# Patient Record
Sex: FEMALE | Race: WHITE | NOT HISPANIC OR LATINO | Employment: OTHER | URBAN - METROPOLITAN AREA
[De-identification: names, ages, dates, MRNs, and addresses within clinical notes are randomized per-mention and may not be internally consistent; named-entity substitution may affect disease eponyms.]

---

## 2017-12-05 ENCOUNTER — HOSPITAL ENCOUNTER (EMERGENCY)
Facility: HOSPITAL | Age: 72
Discharge: HOME | End: 2017-12-05
Attending: EMERGENCY MEDICINE
Payer: MEDICARE

## 2017-12-05 DIAGNOSIS — N30.01 ACUTE CYSTITIS WITH HEMATURIA: ICD-10-CM

## 2017-12-05 DIAGNOSIS — R05.9 COUGH: ICD-10-CM

## 2017-12-05 DIAGNOSIS — R42 DIZZINESS: ICD-10-CM

## 2017-12-05 DIAGNOSIS — J40 BRONCHITIS: Primary | ICD-10-CM

## 2017-12-05 LAB
ALBUMIN SERPL BCP-MCNC: 3.6 G/DL
ALP SERPL-CCNC: 73 U/L
ALT SERPL W/O P-5'-P-CCNC: 29 U/L
ANION GAP SERPL CALC-SCNC: 9 MMOL/L
AST SERPL-CCNC: 21 U/L
BACTERIA #/AREA URNS HPF: ABNORMAL /HPF
BASOPHILS # BLD AUTO: 0.02 K/UL
BASOPHILS NFR BLD: 0.1 %
BILIRUB SERPL-MCNC: 0.4 MG/DL
BILIRUB UR QL STRIP: NEGATIVE
BNP SERPL-MCNC: 78 PG/ML
BUN SERPL-MCNC: 13 MG/DL
CALCIUM SERPL-MCNC: 9.3 MG/DL
CHLORIDE SERPL-SCNC: 102 MMOL/L
CLARITY UR: ABNORMAL
CO2 SERPL-SCNC: 24 MMOL/L
COLOR UR: YELLOW
CREAT SERPL-MCNC: 0.8 MG/DL
DIFFERENTIAL METHOD: ABNORMAL
EOSINOPHIL # BLD AUTO: 0.1 K/UL
EOSINOPHIL NFR BLD: 0.4 %
ERYTHROCYTE [DISTWIDTH] IN BLOOD BY AUTOMATED COUNT: 12.7 %
EST. GFR  (AFRICAN AMERICAN): >60 ML/MIN/1.73 M^2
EST. GFR  (NON AFRICAN AMERICAN): >60 ML/MIN/1.73 M^2
FLUAV AG SPEC QL IA: NEGATIVE
FLUBV AG SPEC QL IA: NEGATIVE
GLUCOSE SERPL-MCNC: 154 MG/DL
GLUCOSE UR QL STRIP: NEGATIVE
HCT VFR BLD AUTO: 42 %
HGB BLD-MCNC: 13.7 G/DL
HGB UR QL STRIP: ABNORMAL
KETONES UR QL STRIP: NEGATIVE
LEUKOCYTE ESTERASE UR QL STRIP: ABNORMAL
LIPASE SERPL-CCNC: 22 U/L
LYMPHOCYTES # BLD AUTO: 1.7 K/UL
LYMPHOCYTES NFR BLD: 12 %
MAGNESIUM SERPL-MCNC: 2 MG/DL
MCH RBC QN AUTO: 29.1 PG
MCHC RBC AUTO-ENTMCNC: 32.6 G/DL
MCV RBC AUTO: 89 FL
MICROSCOPIC COMMENT: ABNORMAL
MONOCYTES # BLD AUTO: 0.9 K/UL
MONOCYTES NFR BLD: 6.6 %
NEUTROPHILS # BLD AUTO: 11.3 K/UL
NEUTROPHILS NFR BLD: 80.9 %
NITRITE UR QL STRIP: POSITIVE
NON-SQ EPI CELLS #/AREA URNS HPF: 3 /HPF
PH UR STRIP: 5 [PH] (ref 5–8)
PHOSPHATE SERPL-MCNC: 2.5 MG/DL
PLATELET # BLD AUTO: 215 K/UL
PMV BLD AUTO: 9.5 FL
POTASSIUM SERPL-SCNC: 3.9 MMOL/L
PROT SERPL-MCNC: 7.9 G/DL
PROT UR QL STRIP: NEGATIVE
RBC # BLD AUTO: 4.71 M/UL
RBC #/AREA URNS HPF: 3 /HPF (ref 0–4)
SODIUM SERPL-SCNC: 135 MMOL/L
SP GR UR STRIP: 1.02 (ref 1–1.03)
SPECIMEN SOURCE: NORMAL
SQUAMOUS #/AREA URNS HPF: 15 /HPF
TROPONIN I SERPL DL<=0.01 NG/ML-MCNC: 0.01 NG/ML
URN SPEC COLLECT METH UR: ABNORMAL
UROBILINOGEN UR STRIP-ACNC: NEGATIVE EU/DL
WBC # BLD AUTO: 14.02 K/UL
WBC #/AREA URNS HPF: >100 /HPF (ref 0–5)
WBC CLUMPS URNS QL MICRO: ABNORMAL

## 2017-12-05 PROCEDURE — 84484 ASSAY OF TROPONIN QUANT: CPT

## 2017-12-05 PROCEDURE — 93010 ELECTROCARDIOGRAM REPORT: CPT | Mod: ,,, | Performed by: INTERNAL MEDICINE

## 2017-12-05 PROCEDURE — 96361 HYDRATE IV INFUSION ADD-ON: CPT

## 2017-12-05 PROCEDURE — 83690 ASSAY OF LIPASE: CPT

## 2017-12-05 PROCEDURE — 25000242 PHARM REV CODE 250 ALT 637 W/ HCPCS: Performed by: EMERGENCY MEDICINE

## 2017-12-05 PROCEDURE — 83735 ASSAY OF MAGNESIUM: CPT

## 2017-12-05 PROCEDURE — 63600175 PHARM REV CODE 636 W HCPCS: Performed by: PHYSICIAN ASSISTANT

## 2017-12-05 PROCEDURE — 94761 N-INVAS EAR/PLS OXIMETRY MLT: CPT

## 2017-12-05 PROCEDURE — 84100 ASSAY OF PHOSPHORUS: CPT

## 2017-12-05 PROCEDURE — 96374 THER/PROPH/DIAG INJ IV PUSH: CPT

## 2017-12-05 PROCEDURE — 85025 COMPLETE CBC W/AUTO DIFF WBC: CPT

## 2017-12-05 PROCEDURE — 99284 EMERGENCY DEPT VISIT MOD MDM: CPT | Mod: 25

## 2017-12-05 PROCEDURE — 80053 COMPREHEN METABOLIC PANEL: CPT

## 2017-12-05 PROCEDURE — 83880 ASSAY OF NATRIURETIC PEPTIDE: CPT

## 2017-12-05 PROCEDURE — 94640 AIRWAY INHALATION TREATMENT: CPT

## 2017-12-05 PROCEDURE — 81000 URINALYSIS NONAUTO W/SCOPE: CPT

## 2017-12-05 PROCEDURE — 25000003 PHARM REV CODE 250: Performed by: EMERGENCY MEDICINE

## 2017-12-05 PROCEDURE — 87400 INFLUENZA A/B EACH AG IA: CPT | Mod: 59

## 2017-12-05 PROCEDURE — 93005 ELECTROCARDIOGRAM TRACING: CPT

## 2017-12-05 RX ORDER — IPRATROPIUM BROMIDE AND ALBUTEROL SULFATE 2.5; .5 MG/3ML; MG/3ML
3 SOLUTION RESPIRATORY (INHALATION)
Status: COMPLETED | OUTPATIENT
Start: 2017-12-05 | End: 2017-12-05

## 2017-12-05 RX ORDER — LEVOFLOXACIN 750 MG/1
750 TABLET ORAL DAILY
Qty: 7 TABLET | Refills: 0 | Status: SHIPPED | OUTPATIENT
Start: 2017-12-05 | End: 2017-12-12

## 2017-12-05 RX ORDER — ALBUTEROL SULFATE 90 UG/1
2 AEROSOL, METERED RESPIRATORY (INHALATION) EVERY 4 HOURS PRN
Qty: 1 INHALER | Refills: 2 | Status: SHIPPED | OUTPATIENT
Start: 2017-12-05

## 2017-12-05 RX ORDER — ONDANSETRON 2 MG/ML
4 INJECTION INTRAMUSCULAR; INTRAVENOUS
Status: COMPLETED | OUTPATIENT
Start: 2017-12-05 | End: 2017-12-05

## 2017-12-05 RX ORDER — BENZONATATE 200 MG/1
200 CAPSULE ORAL 3 TIMES DAILY PRN
Qty: 20 CAPSULE | Refills: 0 | Status: SHIPPED | OUTPATIENT
Start: 2017-12-05 | End: 2017-12-15

## 2017-12-05 RX ORDER — MOXIFLOXACIN HYDROCHLORIDE 400 MG/1
400 TABLET ORAL DAILY
Status: DISCONTINUED | OUTPATIENT
Start: 2017-12-05 | End: 2017-12-06 | Stop reason: HOSPADM

## 2017-12-05 RX ADMIN — SODIUM CHLORIDE 1000 ML: 0.9 INJECTION, SOLUTION INTRAVENOUS at 09:12

## 2017-12-05 RX ADMIN — IPRATROPIUM BROMIDE AND ALBUTEROL SULFATE 3 ML: .5; 3 SOLUTION RESPIRATORY (INHALATION) at 09:12

## 2017-12-05 RX ADMIN — MOXIFLOXACIN HYDROCHLORIDE 400 MG: 400 TABLET, FILM COATED ORAL at 11:12

## 2017-12-05 RX ADMIN — ONDANSETRON 4 MG: 2 INJECTION INTRAMUSCULAR; INTRAVENOUS at 08:12

## 2017-12-06 VITALS
WEIGHT: 144 LBS | HEIGHT: 64 IN | RESPIRATION RATE: 16 BRPM | TEMPERATURE: 99 F | SYSTOLIC BLOOD PRESSURE: 150 MMHG | BODY MASS INDEX: 24.59 KG/M2 | HEART RATE: 88 BPM | DIASTOLIC BLOOD PRESSURE: 71 MMHG | OXYGEN SATURATION: 95 %

## 2017-12-06 NOTE — ED PROVIDER NOTES
Encounter Date: 12/5/2017    SCRIBE #1 NOTE: I, Vidya France, am scribing for, and in the presence of,  Jolanta John MD. I have scribed the following portions of the note - Other sections scribed: HPI, ROS, PE.       History     Chief Complaint   Patient presents with    Abdominal Pain     vomiting and diarrhea x 2 days.  middle/upper abd pains x 3 days.  denies blood in stool or burning with urination.  unknown fever.     CC: Fatigue    HPI: 72 year old female with HTN presents to the ED c/o fatigue, decrease in appetite, subjective fever, rhinorrhea, sore throat, and productive cough that began yesterday. Pt reports she had recent sick contact with her  who had similar symptoms a few days ago.     Pt attempted treatment with tylenol and mucinex which provided no relief. Pt is also c/o generalized abdominal pain that began upon arrival to the ED. Pain is acute onset and moderate (6/10). Pt reports pain has spontaneously resolved. Pt denies having any abdominal pain currently. No further complaints reported. Pt otherwise denies SOB, chest pain, N/V/D, changes in urinary/bowel habits, and any other associated symptoms. No alleviating factors.     No changes to daily medications. She reports she is compliant with her daily medications. No hx of cardiac diseases or DM.    PSHx: Tubal ligation; Appendectomy; and Cholecystectomy      The history is provided by the patient. No  was used.     Review of patient's allergies indicates:   Allergen Reactions    Aspirin Other (See Comments)     Reaction: Abdominal pain    Azithromycin Nausea And Vomiting     Past Medical History:   Diagnosis Date    Hypertension      Past Surgical History:   Procedure Laterality Date    APPENDECTOMY      CHOLECYSTECTOMY      TUBAL LIGATION       Family History   Problem Relation Age of Onset    Hypertension Mother     Ulcers Father      Social History   Substance Use Topics    Smoking status: Never Smoker     Smokeless tobacco: Never Used    Alcohol use No     Review of Systems   Constitutional: Positive for appetite change (decrease), fatigue and fever. Negative for chills and diaphoresis.   HENT: Positive for rhinorrhea and sore throat. Negative for ear pain.    Eyes: Negative for photophobia and visual disturbance.   Respiratory: Positive for cough (productive). Negative for shortness of breath.    Cardiovascular: Negative for chest pain.   Gastrointestinal: Positive for abdominal pain (generalized; resolved). Negative for diarrhea, nausea and vomiting.   Genitourinary: Negative for dysuria.   Musculoskeletal: Negative for back pain and neck stiffness.   Skin: Negative for rash.   Neurological: Negative for headaches.       Physical Exam     Initial Vitals [12/05/17 1846]   BP Pulse Resp Temp SpO2   (!) 164/78 76 17 98.8 °F (37.1 °C) 96 %      MAP       106.67         Physical Exam    Nursing note and vitals reviewed.  Constitutional: She appears well-developed and well-nourished. She is not diaphoretic. She does not appear ill. No distress.   Awake, alert, appears non-toxic   HENT:   Head: Normocephalic and atraumatic.   Dry oropharynx, occasional cough   Eyes: Conjunctivae and EOM are normal. Pupils are equal, round, and reactive to light.   Neck: Normal range of motion. Neck supple.   Cardiovascular: Normal rate, regular rhythm and intact distal pulses.   Pulmonary/Chest: Effort normal and breath sounds normal. No respiratory distress. She has no wheezes. She has no rhonchi. She has no rales.   Abdominal: Soft. Normal appearance and bowel sounds are normal. She exhibits no distension. There is no tenderness.   Musculoskeletal: Normal range of motion. She exhibits no edema or tenderness.   No lower extremity edema   Neurological: She is alert and oriented to person, place, and time. She has normal strength.   Skin: Skin is warm and dry.   Psychiatric: She has a normal mood and affect.         ED Course    Procedures  Labs Reviewed   CBC W/ AUTO DIFFERENTIAL - Abnormal; Notable for the following:        Result Value    WBC 14.02 (*)     Gran # 11.3 (*)     Gran% 80.9 (*)     Lymph% 12.0 (*)     All other components within normal limits   COMPREHENSIVE METABOLIC PANEL - Abnormal; Notable for the following:     Sodium 135 (*)     Glucose 154 (*)     All other components within normal limits   URINALYSIS - Abnormal; Notable for the following:     Appearance, UA Cloudy (*)     Occult Blood UA 1+ (*)     Nitrite, UA Positive (*)     Leukocytes, UA 3+ (*)     All other components within normal limits   PHOSPHORUS - Abnormal; Notable for the following:     Phosphorus 2.5 (*)     All other components within normal limits   URINALYSIS MICROSCOPIC - Abnormal; Notable for the following:     WBC, UA >100 (*)     WBC Clumps, UA Many (*)     Bacteria, UA Many (*)     Non-Squam Epith 3 (*)     All other components within normal limits   LIPASE   MAGNESIUM   INFLUENZA A AND B ANTIGEN   B-TYPE NATRIURETIC PEPTIDE   TROPONIN I     EKG Readings: (Independently Interpreted)   NSR, HR 74, normal axis, normal intervals, no STEMI.       X-Rays:   Independently Interpreted Readings:   Other Readings:  CXR no infiltrate, faint interstitial edema vs scarring    Medical Decision Making:   History:   Old Medical Records: I decided to obtain old medical records.  Old Records Summarized: records from previous admission(s).  Initial Assessment:   This is an emergent evaluation of a 72-year-old female with a history of hypertension who presents with fatigue, subjective fever, rhinorrhea, cough.  Also had brief episode of resolved abdominal pain here.  Differential Diagnosis:   Ddx includes pneumonia, bronchitis, influenza or other viral illness, CHF, ACS, UTI, other.  Independently Interpreted Test(s):   I have ordered and independently interpreted X-rays - see prior notes.  I have ordered and independently interpreted EKG Reading(s) - see prior  notes  Clinical Tests:   Lab Tests: Ordered and Reviewed  Radiological Study: Ordered and Reviewed  Medical Tests: Ordered and Reviewed  ED Management:  EKG no STEMI.  Chest x-ray no pneumonia.  Lab work shows WBC 14 with left shift.  No bandemia.  CMP is unremarkable.  Lipase normal.  Flu negative.  BNP and troponin negative.  UA with 3+ leuks, nitrites.    The patient had a normal saline bolus here as well as DuoNeb's and Zofran.  She felt somewhat at her after this treatment.  She was never febrile, tachycardic, or ill-appearing in the emergency department.    I have prescribed antibiotics for the patient's UTI.  Given the patient's respiratory symptoms, I have chosen Levaquin which will cover both UTI and bronchitis.  I've also prescribed Tessalon and an inhaler to be used as needed for cough.  I have instructed the patient to follow-up closely to her primary care physician.  Her  has an appointment this week.  I also provided her with copies of her lab work, EKG, and imaging reports.              Scribe Attestation:   Scribe #1: I performed the above scribed service and the documentation accurately describes the services I performed. I attest to the accuracy of the note.    Attending Attestation:           Physician Attestation for Scribe:  Physician Attestation Statement for Scribe #1: I, Jolanta John MD, reviewed documentation, as scribed by Vidya France in my presence, and it is both accurate and complete.                 ED Course      Clinical Impression:   The primary encounter diagnosis was Bronchitis. Diagnoses of Dizziness, Cough, and Acute cystitis with hematuria were also pertinent to this visit.                           Jolanta John MD  12/07/17 0432

## 2017-12-06 NOTE — ED TRIAGE NOTES
"Pt presents to ED with c/o weakness and vomiting since yesterday. Pt reports going to work this morning and c/o dizziness, causing her to leave work.  Pt reports vomiting "a lot" today.  Pt reports the vomit is acidic and yellow.  Pt reports having normal BMs, though she reports they are softer than usual.  "